# Patient Record
Sex: FEMALE | Race: OTHER | URBAN - METROPOLITAN AREA
[De-identification: names, ages, dates, MRNs, and addresses within clinical notes are randomized per-mention and may not be internally consistent; named-entity substitution may affect disease eponyms.]

---

## 2020-03-19 ENCOUNTER — EMERGENCY (EMERGENCY)
Facility: HOSPITAL | Age: 45
LOS: 0 days | Discharge: HOME | End: 2020-03-19
Attending: EMERGENCY MEDICINE | Admitting: EMERGENCY MEDICINE
Payer: SUBSIDIZED

## 2020-03-19 VITALS
SYSTOLIC BLOOD PRESSURE: 144 MMHG | DIASTOLIC BLOOD PRESSURE: 84 MMHG | OXYGEN SATURATION: 100 % | TEMPERATURE: 96 F | RESPIRATION RATE: 18 BRPM | HEART RATE: 77 BPM | WEIGHT: 115.08 LBS

## 2020-03-19 DIAGNOSIS — R10.13 EPIGASTRIC PAIN: ICD-10-CM

## 2020-03-19 DIAGNOSIS — R10.9 UNSPECIFIED ABDOMINAL PAIN: ICD-10-CM

## 2020-03-19 LAB
APPEARANCE UR: CLEAR — SIGNIFICANT CHANGE UP
BILIRUB UR-MCNC: NEGATIVE — SIGNIFICANT CHANGE UP
COLOR SPEC: YELLOW — SIGNIFICANT CHANGE UP
DIFF PNL FLD: NEGATIVE — SIGNIFICANT CHANGE UP
GLUCOSE UR QL: NEGATIVE — SIGNIFICANT CHANGE UP
KETONES UR-MCNC: NEGATIVE — SIGNIFICANT CHANGE UP
LEUKOCYTE ESTERASE UR-ACNC: ABNORMAL
NITRITE UR-MCNC: NEGATIVE — SIGNIFICANT CHANGE UP
PH UR: 6.5 — SIGNIFICANT CHANGE UP (ref 5–8)
PROT UR-MCNC: NEGATIVE — SIGNIFICANT CHANGE UP
SP GR SPEC: 1.01 — SIGNIFICANT CHANGE UP (ref 1.01–1.02)
UROBILINOGEN FLD QL: SIGNIFICANT CHANGE UP

## 2020-03-19 PROCEDURE — 99053 MED SERV 10PM-8AM 24 HR FAC: CPT

## 2020-03-19 PROCEDURE — 99283 EMERGENCY DEPT VISIT LOW MDM: CPT

## 2020-03-19 RX ORDER — OMEPRAZOLE 10 MG/1
1 CAPSULE, DELAYED RELEASE ORAL
Qty: 30 | Refills: 0
Start: 2020-03-19 | End: 2020-04-17

## 2020-03-19 NOTE — ED PROVIDER NOTE - NSFOLLOWUPINSTRUCTIONS_ED_ALL_ED_FT
Dolor abdominal en adultos  Abdominal Pain, Adult  El dolor abdominal puede tener muchas causas. A menudo, no es grave y mejora sin tratamiento o con tratamiento en la casa. Sin embargo, a veces el dolor abdominal es intenso. El médico revisará cherri antecedentes médicos y le hará un examen físico para tratar de determinar la causa del dolor abdominal.  Siga estas instrucciones en bryant casa:  [place] los medicamentos de venta ronnie y los recetados solamente syed se lo haya indicado el médico. No tome un laxante a menos que se lo haya indicado el médico.Olive suficiente líquido para mantener la orina kanchan o de color amarillo pálido.Controle bryant afección para shon si hay cambios.Concurra a todas las visitas de control syed se lo haya indicado el médico. [place] es importante.Comuníquese con un médico si:  El dolor abdominal cambia o empeora.No tiene apetito o baja de peso sin proponérselo.Está estreñido o tiene diarrea joel más de 2 o 3 días.Tiene dolor cuando orina o defeca.El dolor abdominal lo despierta de noche.El dolor empeora con las comidas, después de comer o con determinados alimentos.Tiene vómitos y no puede retener nada.Tiene fiebre.Solicite ayuda de inmediato si:  El dolor no desaparece tan pronto syed el médico le dijo que era esperable.No puede detener los vómitos.El dolor se siente solo en zonas del abdomen, syed el lado derecho o la parte inferior izquierda del abdomen.Las heces son sanguinolentas o de color gladys, o de aspecto alquitranado.Tiene dolor intenso, cólicos, o meteorismo en el abdomen.Tiene signos de deshidratación, por ejemplo:  Orina oscura, muy escasa o falta de orina.Labios agrietados.Boca seca.Ojos hundidos.Somnolencia.Debilidad.Esta información no tiene syed fin reemplazar el consejo del médico. Asegúrese de hacerle al médico cualquier pregunta que tenga.      Indigestión  Indigestion  La indigestión es leticia sensación de dolor, molestias, ardor o saciedad en la parte superior del abdomen. Puede irse y volver. Puede suceder con frecuencia o en raras ocasiones. La indigestión suele ocurrir mientras está comiendo o inmediatamente después de terminar de comer. Puede ser peor por la noche y al inclinarse hacia abajo o al acostarse. La indigestión puede ser un síntoma de leticia afección digestiva subyacente.  Siga estas indicaciones en bryant casa:  Comida y bebida        Siga el plan de alimentación que le haya recomendado el médico.Evite ciertos alimentos y bebidas syed se lo haya indicado el médico. Estas pueden incluir:  Chocolate y cacao.Menta y esencia de menta.[place] y cebolla.Rábano picante.Alimentos condimentados, picantes y ácidos, por ejemplo, todos los tipos de pimientas, chile en polvo, driver en polvo, vinagre, salsas picantes y salsa barbacoa.Cítricos, syed naranjas, ethan o gloria.Alimentos a base de tomate, syed salsa de tomate, chile, salsa picante y pizza con salsa de tomate.Alimentos fritos y grasos, syed donas, jesika fritas y aderezos ricos en grasas.Vivi con alto contenido de grasa, syed salchichas, y schaffer de vivi dean y stephanie con mucha grasa, por ejemplo, chuletas o costillas, embutidos, jamón y tocino.Productos lácteos ricos en grasas, syed leche entera, manteca y queso crema.Café y té (con o sin cafeína).Bebidas que contengan alcohol.Bebidas energéticas y deportivas.Bebidas gaseosas o refrescos.Jugos de frutas cítricas.Jabari comidas pequeñas y frecuentes joel el día en lugar de comidas abundantes.Evite beber grandes cantidades de líquidos con las comidas.Evite comer 2 o 3 horas antes de acostarse.Evite recostarse inmediatamente después de comer.Evite hacer ejercicio joel 2 horas después de comer.Estilo de itz         Mantenga un peso saludable. Pregunte a bryant médico cuál es un peso saludable para usted. Si debe perder peso, hable con bryant médico para hacerlo de manera farr.Realice actividad física joel, al menos, 30 minutos 5 días por semana o más, o según lo indicado por bryant médico. Evite los ejercicios que impliquen inclinarse hacia seth. Estos pueden empeorar los síntomas.Use ropa suelta. No use nada apretado alrededor de la cintura que jabari presión sobre el abdomen.No consuma ningún producto que contenga nicotina o tabaco, syed cigarrillos, cigarrillos electrónicos y tabaco de mascar. Estos pueden empeorar los síntomas. Si necesita ayuda para dejar de fumar, consulte al médico.Levante (eleve) la cabecera de la cama aproximadamente 6 pulgadas (15 cm) para dormir.Trate de reducir el nivel de estrés con actividades tales syed el yoga o la meditación. Si necesita ayuda para reducir el nivel de estrés, consulte al médico.Indicaciones generales     [place] los medicamentos de venta ronnie y los recetados solamente syed se lo haya indicado el médico. No tome aspirina, ibuprofeno ni otros antiinflamatorios no esteroideos (ÁNGEL) a menos que el médico se lo indique.Esté atento a cualquier cambio en los síntomas.Concurra a todas las visitas de seguimiento syed se lo haya indicado el médico. [place] es importante.Comuníquese con un médico si:  Aparecen nuevos síntomas.Baja de peso sin causa aparente.Tiene dificultad para tragar, o le duele cuando traga.Los síntomas no mejoran con el tratamiento.Cherri síntomas urrutia más de 2 días.Tiene fiebre.Vomita.Solicite ayuda inmediatamente si:  Siente dolor en los brazos, el agustin, la mandíbula, los dientes o la espalda.Se siente transpirado, mareado o tiene leticia sensación de desvanecimiento.Se desmaya.Siente falta de aire o dolor en el pecho.No puede dejar de vomitar o vomita krysta.Las heces son sanguinolentas o negras.Siente un dolor intenso en el abdomen.Estos síntomas pueden representar un problema grave que constituye leticia emergencia. No espere a shon si los síntomas desaparecen. Solicite atención médica de inmediato. Comuníquese con el servicio de emergencias de bryant localidad (911 en los [place]). No conduzca por cherri propios medios hasta el hospital.   Resumen  La indigestión es leticia sensación de dolor, molestias, ardor o saciedad en la parte superior del abdomen. Suele ocurrir mientras está comiendo o inmediatamente después de terminar de comer.Siga el plan de alimentación y otros cambios en el estilo de itz que le haya indicado el médico.[place] los medicamentos de venta ronnie y los recetados solamente syed se lo haya indicado el médico. No tome aspirina, ibuprofeno ni otros antiinflamatorios no esteroideos (ÁNGEL) a menos que el médico se lo indique.Comuníquese con bryant médico si los síntomas no mejoran o si empeoran. Algunos síntomas pueden representar un problema grave que constituye leticia emergencia. No espere a shon si los síntomas desaparecen. Solicite atención médica de inmediato.Esta información no tiene syed fin reemplazar el consejo del médico. Asegúrese de hacerle al médico cualquier pregunta que tenga.

## 2020-03-19 NOTE — ED PROVIDER NOTE - PROGRESS NOTE DETAILS
gerd like/pud/dyspepsia sx for 3 weeks. nml vitals. n n, v. no bleeding. no ab tenderness. ?biliary vs gsatritis/pud/gerd.  no indication for emergent testing at this point. she can have outpt GI and pmd eval.  or she can return if she dev. fever, worsening pain or bleeding.    visit/h and p/all discussions translated by CARL Thorne.

## 2020-03-19 NOTE — ED PROVIDER NOTE - OBJECTIVE STATEMENT
43 yo f co epigastric burning/fullness. after she eats she feels full early on, then it feel like its coming back up her throat, burning sensation and feels soimething stuck in her neck. this has been going on for 3 weeks. she saw her doctor and was given zantac, but she sts it makes her feel sick. she has been taking ibuprofen and aspirin every day for the last week. no black or bloody stools. no n, v, d. no cp or sob. no fever or chills.  no diff eating/speaking/breathing/swallowing.   also sts it her breath feels "hot" when she exales. 43 yo f co epigastric burning/fullness. after she eats she feels full early on, then it feel like its coming back up her throat, burning sensation and feels soimething stuck in her neck. this has been going on for 3 weeks. she saw her doctor and was given zantac, but she sts it makes her feel sick. she has been taking ibuprofen and aspirin every day for the last week. no black or bloody stools. no n, v, d. no cp or sob. no fever or chills.  no diff eating/speaking/breathing/swallowing.   also sts it her breath feels "hot" when she exales.  Denies cough, respiratory complaints, fever, known COVID contact or cluster exposure, travel to endemic areas.  Pt seen during COVID 19 Pandemic.

## 2020-03-19 NOTE — ED PROVIDER NOTE - PATIENT PORTAL LINK FT
You can access the FollowMyHealth Patient Portal offered by [facility] by registering at the following website: [email or web address]. By joining [employer]’s FollowMyHealth portal, you will also be able to view your health information using other applications (apps) compatible with our system.

## 2020-03-19 NOTE — ED PROVIDER NOTE - CARE PROVIDER_API CALL
Abhinav Rose)  Gastroenterology; Internal Medicine  [address] [place] [address]  Phone: [phone]  Fax: [phone]  Follow Up Time:

## 2020-03-19 NOTE — ED ADULT NURSE NOTE - NSIMPLEMENTINTERV_GEN_ALL_ED
Implemented All Universal Safety Interventions:  [place] to call system. Call bell, personal items and telephone within reach. Instruct patient to call for assistance. Room bathroom lighting operational. Non-slip footwear when patient is off stretcher. Physically safe environment: no spills, clutter or unnecessary equipment. Stretcher in lowest position, wheels locked, appropriate side rails in place.

## 2020-03-19 NOTE — ED PROVIDER NOTE - PHYSICAL EXAMINATION
VITAL SIGNS: I have reviewed nursing notes and confirm.  CONSTITUTIONAL: Well-developed; well-nourished; in no acute distress.  SKIN: Skin exam is warm and dry, no acute rash.  HEAD: Normocephalic; atraumatic.  EYES: PERRL, EOM intact; conjunctiva and sclera clear.  ENT: No nasal discharge; airway clear.   NECK: Supple; non tender.  CARD:+ S1, S2   RESP: No wheezes, rales or rhonchi.  ABD: Normal bowel sounds; soft; non-distended; non-tender; no gaurding or rebound. no palpable masses.  EXT: Normal ROM. No cyanosis or edema.  LYMPH: No acute adenopathy.  NEURO: Alert. Grossly unremarkable. No focal deficits.  PSYCH: Cooperative, appropriate.

## 2020-03-19 NOTE — ED ADULT NURSE NOTE - OBJECTIVE STATEMENT
pt presents to ed with c/o epigastric burning/fullness after eating. pt states is feel like she gets full to quickly and then has a burning pain up to her neck. pt has been taking ibuprofen and aspirin every day for the last week. no black or bloody stools. pt denies n/v/d, cp, sob, fever, chills.

## 2020-03-21 LAB
CULTURE RESULTS: SIGNIFICANT CHANGE UP
SPECIMEN SOURCE: SIGNIFICANT CHANGE UP

## 2020-03-24 PROBLEM — Z00.00 ENCOUNTER FOR PREVENTIVE HEALTH EXAMINATION: Status: ACTIVE | Noted: 2020-03-24

## 2020-06-02 ENCOUNTER — APPOINTMENT (OUTPATIENT)
Dept: GASTROENTEROLOGY | Facility: CLINIC | Age: 45
End: 2020-06-02

## 2024-01-02 NOTE — ED ADULT NURSE NOTE - NS ED NURSE LEVEL OF CONSCIOUSNESS AFFECT
[facility] Title: Evaluation of Skin Lesions How Severe Are Your Spot(S)?: mild Have Your Spot(S) Been Treated In The Past?: has not been treated